# Patient Record
Sex: FEMALE | Race: WHITE
[De-identification: names, ages, dates, MRNs, and addresses within clinical notes are randomized per-mention and may not be internally consistent; named-entity substitution may affect disease eponyms.]

---

## 2018-12-14 ENCOUNTER — HOSPITAL ENCOUNTER (OUTPATIENT)
Dept: HOSPITAL 59 - HOP | Age: 58
Discharge: HOME | End: 2018-12-14
Attending: INTERNAL MEDICINE
Payer: OTHER GOVERNMENT

## 2018-12-14 DIAGNOSIS — D12.4: ICD-10-CM

## 2018-12-14 DIAGNOSIS — Z12.11: Primary | ICD-10-CM

## 2018-12-14 DIAGNOSIS — E11.9: ICD-10-CM

## 2018-12-14 DIAGNOSIS — K57.30: ICD-10-CM

## 2018-12-14 DIAGNOSIS — I10: ICD-10-CM

## 2018-12-19 NOTE — OPERATIVE NOTE
DATE OF SURGERY: 12/14/2018



SURGEON: Juan A Hector MD  



OPERATION: COLONOSCOPY. 



INDICATIONS: This is a 57-year-old female with average risk for colorectal cancer who presented for 
screening colonoscopy. Last colonoscopy was about 5 years ago. 



POSTOPERATIVE DIAGNOSES:

1. Left-sided colonic diverticulosis. 

2. Three 5-6 mm sessile polyps that were removed by cold snare. 

3. Grade 1 internal hemorrhoids. 



ANESTHESIA: Sedation is per Anesthesia. Pulse oximetry was monitored throughout the procedure to 
maintain O2 saturation of 90% or greater. Supplemental oxygen was administered via nasal cannula. 
Cardiac and vital signs were monitored throughout the duration of the procedure, and they were 
stable. 



The procedure of colonoscopy and risks and alternatives of the procedure, including the risk of 
bleeding and perforation, among others, were explained to the patient who voiced understanding and 
agreed to have the procedure done. Physical examination was performed, and the patient was found 
stable for sedation. 



PROCEDURE: The patient was placed in the left lateral position. Sedation was initiated. A digital 
rectal exam was performed and showed some mild external hemorrhoids with no palpable rectal masses. 
An Olympus PCF-180AL colonoscope was then inserted into the rectum under direct visualization. It 
was advanced to the cecum without difficulty. The ileocecal valve and appendiceal orifice were 
identified and photographed. The colonic mucosa was carefully examined upon introduction of the 
colonoscope. There were scattered diverticula noted in the sigmoid and descending colon. In the 
descending colon were three 5-6 mm sessile polyps that were noted and removed by cold snare. There 
were no other lesions noted. The colonoscope was then withdrawn after the ileocecal valve was 
intubated and terminal ileal mucosa was inspected for about 10 cm and it appeared normal. The cecum, 
ascending colon, transverse colon, the rest of the descending colon, and sigmoid colon mucosa 
revealed no other lesions. In the rectum, retroflexion was performed and grade 1 internal 
hemorrhoids were noted. The colonoscope was then withdrawn and the procedure was terminated. The 
patient tolerated the procedure well without any immediate complications. The patient remained with 
stable vital signs and was transferred to the recovery room.



RECOMMENDATIONS: 

1. The patient should be on a high-fiber diet. 

2. The patient is to have a repeat colonoscopy for surveillance in 3 or 5 years depending on the 
histology of the polyps.  



Thank you for allowing me to participate in the care of your patient.

KAMILLA